# Patient Record
Sex: FEMALE | Race: BLACK OR AFRICAN AMERICAN | Employment: OTHER | URBAN - NONMETROPOLITAN AREA
[De-identification: names, ages, dates, MRNs, and addresses within clinical notes are randomized per-mention and may not be internally consistent; named-entity substitution may affect disease eponyms.]

---

## 2022-10-04 RX ORDER — OXYBUTYNIN CHLORIDE 5 MG/1
5 TABLET ORAL NIGHTLY
COMMUNITY

## 2022-10-04 RX ORDER — LANSOPRAZOLE 30 MG/1
30 CAPSULE, DELAYED RELEASE ORAL DAILY
COMMUNITY

## 2022-10-04 RX ORDER — FAMOTIDINE 20 MG/1
20 TABLET, FILM COATED ORAL 2 TIMES DAILY
COMMUNITY

## 2022-10-04 RX ORDER — ROSUVASTATIN CALCIUM 10 MG/1
10 TABLET, COATED ORAL NIGHTLY
COMMUNITY

## 2022-10-04 RX ORDER — OLMESARTAN MEDOXOMIL AND HYDROCHLOROTHIAZIDE 40/25 40; 25 MG/1; MG/1
1 TABLET ORAL DAILY
COMMUNITY

## 2022-10-04 RX ORDER — ESTRADIOL 0.5 MG/1
0.5 TABLET ORAL DAILY
COMMUNITY

## 2022-10-04 RX ORDER — FOLIC ACID 1 MG/1
1 TABLET ORAL DAILY
COMMUNITY

## 2022-10-04 RX ORDER — FLASH GLUCOSE SENSOR
KIT MISCELLANEOUS 3 TIMES DAILY
COMMUNITY

## 2022-10-04 RX ORDER — CITALOPRAM 20 MG/1
20 TABLET ORAL DAILY
COMMUNITY

## 2022-10-06 ENCOUNTER — OFFICE VISIT (OUTPATIENT)
Dept: GASTROENTEROLOGY | Age: 67
End: 2022-10-06
Payer: MEDICARE

## 2022-10-06 VITALS
BODY MASS INDEX: 24.22 KG/M2 | HEART RATE: 66 BPM | SYSTOLIC BLOOD PRESSURE: 110 MMHG | HEIGHT: 62 IN | WEIGHT: 131.6 LBS | OXYGEN SATURATION: 98 % | DIASTOLIC BLOOD PRESSURE: 68 MMHG

## 2022-10-06 DIAGNOSIS — R63.4 WEIGHT LOSS: Primary | ICD-10-CM

## 2022-10-06 PROCEDURE — 99204 OFFICE O/P NEW MOD 45 MIN: CPT | Performed by: NURSE PRACTITIONER

## 2022-10-06 PROCEDURE — 1123F ACP DISCUSS/DSCN MKR DOCD: CPT | Performed by: NURSE PRACTITIONER

## 2022-10-06 RX ORDER — CHOLECALCIFEROL (VITAMIN D3) 125 MCG
CAPSULE ORAL DAILY
COMMUNITY

## 2022-10-06 ASSESSMENT — ENCOUNTER SYMPTOMS
TROUBLE SWALLOWING: 0
BLOOD IN STOOL: 0
CONSTIPATION: 0
NAUSEA: 0
ANAL BLEEDING: 0
ABDOMINAL PAIN: 0
DIARRHEA: 0
COUGH: 0
VOMITING: 0
CHOKING: 0
SHORTNESS OF BREATH: 0
RECTAL PAIN: 0
ABDOMINAL DISTENTION: 0

## 2022-10-06 NOTE — PROGRESS NOTES
Subjective:     Patient ID: Jessica Cervantes is a 75 y.o. female  PCP: Ambar Mckeon  Referring Provider: Nelly Barr, CNP    HPI  Patient presents to the office today with the following complaints: New Patient (Patient referred by MIMA Gtz for weight loss. )      Pt seen today for weight loss. Pt reports weight loss of 15-20 lbs in the last few months. Denies any abdominal pain, nausea, vomiting, diarrhea, constipation, changes in bowel habits, and blood in stool. She reports normal appetite. Hx chronic GERD, controlled with Prevacid daily and Pepcid. Last Colonoscopy - diverticulosis per pt (Dr Jocelyne Freitas)  Family hx colon cancer - Mother    Assessment:     1. Weight loss            Plan:   - Schedule Colonoscopy and EGD  Instruct on bowel prep. Nothing to eat or drink after midnight the day of the exam.  Unable to drive for 24 hours after the procedure. No aspirin or nonsteroidal anti-inflammatories for 5 days before procedure. I have discussed the benefits, alternatives, and risks (including bleeding, perforation and death)  for pursuing Endoscopy (EGD/Colonscopy/EUS/ERCP) with the patient and they are willing to continue. We also discussed the need for anesthesia, IV access, proper dietary changes, medication changes if necessary, and need for bowel prep (if ordered) prior to their Endoscopic procedure. They are aware they must have someone accompany them to their scheduled procedure to drive them home - they agree to the above and are willing to continue. Orders  No orders of the defined types were placed in this encounter. Medications  No orders of the defined types were placed in this encounter. Patient History:     History reviewed. No pertinent past medical history.     Past Surgical History:   Procedure Laterality Date    ANKLE SURGERY Left     ARM SURGERY Left     BACK SURGERY      2     SECTION      3    CYST REMOVAL Right     Under Right Breast HEMORRHOID SURGERY      HYSTERECTOMY, TOTAL ABDOMINAL (CERVIX REMOVED)      KNEE ARTHROSCOPY         Family History   Problem Relation Age of Onset    Colon Cancer Mother     Heart Disease Mother     Stroke Mother     Diabetes Mother     Hypertension Mother     Heart Attack Father        Social History     Socioeconomic History    Marital status:      Spouse name: None    Number of children: None    Years of education: None    Highest education level: None   Tobacco Use    Smoking status: Never    Smokeless tobacco: Never   Substance and Sexual Activity    Alcohol use: Not Currently       Current Outpatient Medications   Medication Sig Dispense Refill    Cholecalciferol (VITAMIN D) 125 MCG (5000 UT) CAPS Take by mouth daily      NONFORMULARY Take 500 mg by mouth daily Fermented Beet      lansoprazole (PREVACID) 30 MG delayed release capsule Take 30 mg by mouth daily      rosuvastatin (CRESTOR) 10 MG tablet Take 10 mg by mouth at bedtime      olmesartan-hydroCHLOROthiazide (BENICAR HCT) 40-25 MG per tablet Take 1 tablet by mouth daily      famotidine (PEPCID) 20 MG tablet Take 20 mg by mouth 2 times daily      citalopram (CELEXA) 20 MG tablet Take 20 mg by mouth daily      metFORMIN (GLUCOPHAGE) 500 MG tablet Take 500 mg by mouth 2 times daily (with meals)      estradiol (ESTRACE) 0.5 MG tablet Take 0.5 mg by mouth daily      oxybutynin (DITROPAN) 5 MG tablet Take 5 mg by mouth at bedtime      Methotrexate 2.5 MG/ML SOLN Take by mouth once a week      folic acid (FOLVITE) 1 MG tablet Take 1 mg by mouth daily      Continuous Blood Gluc Sensor ([address] [place] [address]) MISC by Does not apply route in the morning, at noon, and at bedtime       No current facility-administered medications for this visit. Allergies   Allergen Reactions    Penicillins Itching       Review of Systems   Constitutional:  Positive for unexpected weight change. Negative for activity change, appetite change, fatigue and fever. HENT:  Negative for trouble swallowing. Respiratory:  Negative for cough, choking and shortness of breath. Cardiovascular:  Negative for chest pain. Gastrointestinal:  Negative for abdominal distention, abdominal pain, anal bleeding, blood in stool, constipation, diarrhea, nausea, rectal pain and vomiting. Allergic/Immunologic: Negative for food allergies. All other systems reviewed and are negative. Objective:     /68   Pulse 66   Ht 5' 2\" (1.575 m)   Wt 131 lb 9.6 oz (59.7 kg)   SpO2 98%   BMI 24.07 kg/m²     Physical Exam  Vitals reviewed. Constitutional:       General: She is not in acute distress. Appearance: She is well-developed. HENT:      Head: Normocephalic and atraumatic. Right Ear: External ear normal.      Left Ear: External ear normal.      Nose: Nose normal.   Eyes:      Conjunctiva/sclera: Conjunctivae normal.      Pupils: Pupils are equal, round, and reactive to light. Cardiovascular:      Rate and Rhythm: Normal rate and regular rhythm. Heart sounds: Normal heart sounds. No murmur heard. No friction rub. No gallop. Pulmonary:      Effort: Pulmonary effort is normal. No respiratory distress. Breath sounds: Normal breath sounds. Abdominal:      General: Bowel sounds are normal. There is no distension. Palpations: Abdomen is soft. There is no mass. Tenderness: There is no abdominal tenderness. There is no guarding or rebound. Musculoskeletal:         General: Normal range of motion. Cervical back: Normal range of motion and neck supple. Skin:     General: Skin is warm and dry. Findings: No rash. Nails: There is no clubbing. Neurological:      Mental Status: She is alert and oriented to person, place, and time. Gait: Gait normal.   Psychiatric:         Behavior: Behavior normal.         Thought Content:  Thought content normal.

## 2022-10-06 NOTE — PATIENT INSTRUCTIONS
Schedule colonoscopy and endoscopy. Do not eat or drink after midnight the day of the procedure. Allowed medications can be taken with a small sip of water. Please review your prep instructions for allowed medications. You will not be able to drive for 24 hours after the procedure due to sedation. Must have a responsible adult, 18 years or older, accompany you to drive you home the day of your procedure. If you are on blood thinners, clearance from the prescribing physician will be obtained before your procedure is scheduled. If it is determined it is not safe to hold these medications for a short time an alternative procedure for evaluation may be recommended. No aspirin, ibuprofen, naproxen, fish oil or vitamin E for 5 days before procedure. Risks of colonoscopy and endoscopy include, but are not limited to, perforation, bleeding, and infection, Risk of perforation and bleeding are increased if there is a polyp removed or dilation completed. Anesthesia risks will be reviewed with you before the procedure by a member of the anesthesia department. Your physician may also schedule a follow up appointment with the nurse practitioner to discuss pathology, symptoms or to check if you have had any problems related to your procedure. If you prefer not to return to the office after your procedure please discuss this with your physician on the day of your colonoscopy. The physician will talk with you and/or your family after the procedure is completed. Final recommendations are based on the pathologist report if biopsies or specimens are taken. If polyps are removed during the procedure they will be sent to a pathologist for analysis. Unless you have a follow up appointment scheduled, you will be notified by mail of the pathology results within 4 weeks. If you have not received results after 4 weeks you may call the office to obtain this information. For Colonoscopy:   You will be given specific directions regarding restrictions to diet and bowel prep instructions including laxatives. Please read these instructions one week prior to your scheduled procedure to ensure that you are prepared. If you have any questions regarding these instructions please call our office Mon through Fri from 8:00 am to 4:00 pm.     Follow prep instructions provided for bowel prep. Take all of the bowel prep as directed. If you are having problems with nausea, stop your prep for 30-45 min to allow the nausea to subside before resuming your prep. It is important to drink plenty of fluids throughout the day before taking your laxatives. This will help to protect your kidneys, prevent dehydration and maximize the effect of the bowel prep. Your diet before a colonoscopy bowel preparation is very important to ensure a successful colon exam. It is recommended to consider certain changes to your diet three to four days prior to the procedure. Remember that your bowels need to be completely empty for the exam.    What foods are good to eat? Cut down on heavy solid foods three to four days before the procedure and start introducing lighter meals to your diet. The following food suggestions are a good part of your diet before a colonoscopy bowel preparation. Light meat that is easily digestible such as chicken (without the skin)   Potatoes without skin   Cheese   Eggs   A light meal of steamed white fish   Light clear soups    Foods and drinks to avoid  Avoid foods that contain too much fiber. Stay clear of dark colored beverages. They can stick to the walls of the digestive tract and make it difficult to differentiate from blood.  Some of these foods are:  Red meat, rice, nuts and vegetables   Milk, other milk based fluids and cream   Most fruit and puddings   Whole grain pasta   Cereals, bran and seeds   Colored beverages, especially those that are red or purple in color   Red colored Jell-O   On the day before the colonoscopy, continue to drink plenty of clear fluids. It is important   to keep yourself hydrated before the exam.     Please follow all instructions as provided for cleansing the bowel. Failure to have an adequately prepped colon may cause you to have incomplete exam with further testing required.      [email or web address]

## 2022-10-11 ENCOUNTER — APPOINTMENT (OUTPATIENT)
Dept: OPERATING ROOM | Age: 67
End: 2022-10-11

## 2022-10-11 ENCOUNTER — ANESTHESIA EVENT (OUTPATIENT)
Dept: OPERATING ROOM | Age: 67
End: 2022-10-11

## 2022-10-11 ENCOUNTER — HOSPITAL ENCOUNTER (OUTPATIENT)
Age: 67
Setting detail: SPECIMEN
Discharge: HOME | End: 2022-10-11
Payer: MEDICARE

## 2022-10-11 ENCOUNTER — HOSPITAL ENCOUNTER (OUTPATIENT)
Age: 67
Setting detail: OUTPATIENT SURGERY
Discharge: HOME | End: 2022-10-11
Attending: INTERNAL MEDICINE | Admitting: INTERNAL MEDICINE
Payer: MEDICARE

## 2022-10-11 ENCOUNTER — ANESTHESIA (OUTPATIENT)
Dept: OPERATING ROOM | Age: 67
End: 2022-10-11

## 2022-10-11 ENCOUNTER — TELEPHONE (OUTPATIENT)
Dept: GASTROENTEROLOGY | Age: 67
End: 2022-10-11

## 2022-10-11 VITALS
DIASTOLIC BLOOD PRESSURE: 61 MMHG | HEART RATE: 69 BPM | OXYGEN SATURATION: 95 % | WEIGHT: 131 LBS | BODY MASS INDEX: 24.11 KG/M2 | SYSTOLIC BLOOD PRESSURE: 111 MMHG | HEIGHT: 62 IN | RESPIRATION RATE: 16 BRPM | TEMPERATURE: 97.3 F

## 2022-10-11 DIAGNOSIS — R63.4 WEIGHT LOSS: Primary | ICD-10-CM

## 2022-10-11 PROCEDURE — 43239 EGD BIOPSY SINGLE/MULTIPLE: CPT

## 2022-10-11 PROCEDURE — 88342 IMHCHEM/IMCYTCHM 1ST ANTB: CPT

## 2022-10-11 PROCEDURE — 43239 EGD BIOPSY SINGLE/MULTIPLE: CPT | Performed by: INTERNAL MEDICINE

## 2022-10-11 PROCEDURE — 45378 DIAGNOSTIC COLONOSCOPY: CPT

## 2022-10-11 PROCEDURE — 45378 DIAGNOSTIC COLONOSCOPY: CPT | Performed by: INTERNAL MEDICINE

## 2022-10-11 PROCEDURE — 88305 TISSUE EXAM BY PATHOLOGIST: CPT

## 2022-10-11 RX ORDER — DIPHENHYDRAMINE HYDROCHLORIDE 50 MG/ML
12.5 INJECTION INTRAMUSCULAR; INTRAVENOUS
Status: CANCELLED | OUTPATIENT
Start: 2022-10-11 | End: 2022-10-12

## 2022-10-11 RX ORDER — ONDANSETRON 2 MG/ML
4 INJECTION INTRAMUSCULAR; INTRAVENOUS
Status: CANCELLED | OUTPATIENT
Start: 2022-10-11 | End: 2022-10-12

## 2022-10-11 RX ORDER — LIDOCAINE HYDROCHLORIDE 10 MG/ML
INJECTION, SOLUTION EPIDURAL; INFILTRATION; INTRACAUDAL; PERINEURAL PRN
Status: DISCONTINUED | OUTPATIENT
Start: 2022-10-11 | End: 2022-10-11 | Stop reason: SDUPTHER

## 2022-10-11 RX ORDER — SODIUM CHLORIDE 0.9 % (FLUSH) 0.9 %
5-40 SYRINGE (ML) INJECTION EVERY 12 HOURS SCHEDULED
Status: CANCELLED | OUTPATIENT
Start: 2022-10-11

## 2022-10-11 RX ORDER — SODIUM CHLORIDE 0.9 % (FLUSH) 0.9 %
5-40 SYRINGE (ML) INJECTION PRN
Status: CANCELLED | OUTPATIENT
Start: 2022-10-11

## 2022-10-11 RX ORDER — PROPOFOL 10 MG/ML
INJECTION, EMULSION INTRAVENOUS PRN
Status: DISCONTINUED | OUTPATIENT
Start: 2022-10-11 | End: 2022-10-11 | Stop reason: SDUPTHER

## 2022-10-11 RX ORDER — SODIUM CHLORIDE 9 MG/ML
INJECTION, SOLUTION INTRAVENOUS CONTINUOUS
Status: DISCONTINUED | OUTPATIENT
Start: 2022-10-11 | End: 2022-10-11 | Stop reason: HOSPADM

## 2022-10-11 RX ORDER — SODIUM CHLORIDE 9 MG/ML
INJECTION, SOLUTION INTRAVENOUS PRN
Status: CANCELLED | OUTPATIENT
Start: 2022-10-11

## 2022-10-11 RX ADMIN — PROPOFOL 480 MG: 10 INJECTION, EMULSION INTRAVENOUS at 10:40

## 2022-10-11 RX ADMIN — LIDOCAINE HYDROCHLORIDE 30 MG: 10 INJECTION, SOLUTION EPIDURAL; INFILTRATION; INTRACAUDAL; PERINEURAL at 10:40

## 2022-10-11 RX ADMIN — SODIUM CHLORIDE: 9 INJECTION, SOLUTION INTRAVENOUS at 09:41

## 2022-10-11 NOTE — H&P
Patient Name: Jessica Cervantes  : 1955  MRN: [id number]  DATE: 10/11/22    Allergies: Allergies   Allergen Reactions    Penicillins Itching        ENDOSCOPY  History and Physical    Procedure:    [x] Diagnostic Colonoscopy       [] Screening Colonoscopy  [x] EGD      [] ERCP      [] EUS       [] Other    [x] Previous office notes/History and Physical reviewed from the patients chart. Please see EMR for further details of HPI. I have examined the patient's status immediately prior to the procedure and:      Indications/HPI: For both EGD and colonoscopy exams today:  1 Weight loss -unintentional   2. Family hx colon cancer - Mother  Last Colonoscopy - diverticulosis per pt (Dr Jocelyne Freitas)    []Abdominal Pain   []Cancer- GI/Lung     []Fhx of colon CA/polyps  []History of Polyps  []Barretts            []Melena  []Abnormal Imaging              []Dysphagia              []Persistent Pneumonia   []Anemia                            []Food Impaction        []History of Polyps  [] GI Bleed             []Pulmonary nodule/Mass   []Change in bowel habits []Heartburn/Reflux  []Rectal Bleed (BRBPR)  []Chest Pain - Non Cardiac []Heme (+) Stool []Ulcers  []Constipation  []Hemoptysis  []Varices  []Diarrhea  []Hypoxemia    []Nausea/Vomiting   []Screening   []Crohns/Colitis  []Other:     Anesthesia:   [x] MAC [] Moderate Sedation   [] General   [] None     ROS: 12 pt Review of Symptoms was negative unless mentioned above    Medications:   Prior to Admission medications    Medication Sig Start Date End Date Taking?  Authorizing Provider   Cholecalciferol (VITAMIN D) 125 MCG (5000 UT) CAPS Take by mouth daily    Historical Provider, MD   NONFORMULARY Take 500 mg by mouth daily Fermented Beet    Historical Provider, MD   lansoprazole (PREVACID) 30 MG delayed release capsule Take 30 mg by mouth daily    Historical Provider, MD   rosuvastatin (CRESTOR) 10 MG tablet Take 10 mg by mouth at bedtime    Historical Provider, MD olmesartan-hydroCHLOROthiazide (BENICAR HCT) 40-25 MG per tablet Take 1 tablet by mouth daily    Historical Provider, MD   famotidine (PEPCID) 20 MG tablet Take 20 mg by mouth 2 times daily    Historical Provider, MD   citalopram (CELEXA) 20 MG tablet Take 20 mg by mouth daily    Historical Provider, MD   metFORMIN (GLUCOPHAGE) 500 MG tablet Take 500 mg by mouth 2 times daily (with meals)    Historical Provider, MD   estradiol (ESTRACE) 0.5 MG tablet Take 0.5 mg by mouth daily    Historical Provider, MD   Continuous Blood Gluc Sensor ([address] [place] [address]) MISC by Does not apply route in the morning, at noon, and at bedtime  Patient not taking: Reported on 10/11/2022    Historical Provider, MD   oxybutynin (DITROPAN) 5 MG tablet Take 5 mg by mouth at bedtime    Historical Provider, MD   Methotrexate 2.5 MG/ML SOLN Take by mouth once a week    Historical Provider, MD   folic acid (FOLVITE) 1 MG tablet Take 1 mg by mouth daily    Historical Provider, MD       Past Medical History:  Past Medical History:   Diagnosis Date    Arthritis     Diabetes mellitus ([facility] [address]     GERD (gastroesophageal reflux disease)     Hyperlipidemia     Hypertension        Past Surgical History:  Past Surgical History:   Procedure Laterality Date    ANKLE SURGERY Left     ARM SURGERY Left     BACK SURGERY      2     SECTION      3    CYST REMOVAL Right     Under Right Breast    HEMORRHOID SURGERY      HYSTERECTOMY, TOTAL ABDOMINAL (CERVIX REMOVED)      KNEE ARTHROSCOPY         Social History:  Social History     Tobacco Use    Smoking status: Never    Smokeless tobacco: Never   Vaping Use    Vaping Use: Never used   Substance Use Topics    Alcohol use: Not Currently    Drug use: Never       Vital Signs:   Vitals:    10/11/22 0908   BP: (!) 146/77   Pulse: 56   Resp: 16   Temp: 97.3 °F (36.3 °C)   SpO2: 100%        Physical Exam:  Cardiac:  [x]WNL  []Comments:  Pulmonary:  [x]WNL   []Comments:  Neuro/Mental Status:  [x]WNL []Comments:  Abdominal:  [x]WNL    []Comments:  Other:   []WNL  []Comments:    Informed Consent:  The risks and benefits of the procedure have been discussed with either the patient or if they cannot consent, their representative. Assessment:  Patient examined and appropriate for planned sedation and procedure. Plan:  Proceed with planned sedation and procedure as above.          Marcia Roberto MD

## 2022-10-11 NOTE — OP NOTE
Endoscopic Procedure Note    Patient: Leslye Douglas : 1955  Med Rec#: [id number] Acc#: [id number]     Primary Care Provider Timothy Land    Endoscopist: Maliha Gottlieb MD, MD    Date of Procedure:  10/11/2022    Procedure:   1. EGD with cold biopsies    Indications: For both EGD and colonoscopy exams today:  1 Weight loss -unintentional   2. Family hx colon cancer - Mother  Last Colonoscopy - diverticulosis per pt (Dr Isak Richardson)    Anesthesia:  Sedation was administered by anesthesia who monitored the patient during the procedure. Estimated Blood Loss: minimal    Procedure:   After reviewing the patient's chart and obtaining informed consent, the patient was placed in the left lateral decubitus position. A forward-viewing Olympus endoscope was lubricated and inserted through the mouth into the oropharynx. Under direct visualization, the upper esophagus was intubated. The scope was advanced to the level of the third portion of duodenum. Scope was slowly withdrawn with careful inspection of the mucosal surfaces. The scope was retroflexed for inspection of the gastric fundus and incisura. Findings and maneuvers are listed in impression below. The patient tolerated the procedure well. The scope was removed. There were no immediate complications. Findings/IMPRESSION:  Esophagus: normal and EG junction at 35 cm also appeared normal.    There is a small 2 to 3 cm in length hiatal hernia present. Stomach:  abnormal: Patchy mucosal changes with areas of erythema and few small 1 to 2 mm erosions in the antrum near the pylorus suggestive of mild gastritis noted -  Gastric biopsies were taken from the antrum and body to rule out Helicobacter pylori infection. NO erosions or ulcers or nodules or strictures or webs or rings or mass lesions or extrinsic compression or diverticula noted.  An empirical [address] fr bougie dilation was performed and random cold biopsies were taken to check for EoE and NERD. Duodenum: Normal. Random biopsies were taken to check for Celiac disease and other causes of villous atrophy. No obvious lesions to explain the patient's unintentional weight loss were discovered on this exam.    RECOMMENDATIONS:    1. Await path results, the patient will be contacted in 7-10 days with biopsy results. The results were discussed with the patient and family. A copy of the images obtained were given to the patient.      (Please note that portions of this note were completed with a voice recognition program. Efforts were made to edit the dictations but occasionally words may be mis-transcribed.)     Keon Marie MD, MD  10/11/2022  10:43 AM

## 2022-10-11 NOTE — ANESTHESIA PRE PROCEDURE
 0.9 % sodium chloride infusion   IntraVENous Continuous Hitesh Ruiz MD           Allergies: Allergies   Allergen Reactions    Penicillins Itching       Problem List:  There is no problem list on file for this patient. Past Medical History:        Diagnosis Date    Arthritis     Diabetes mellitus ([address]     GERD (gastroesophageal reflux disease)     Hyperlipidemia     Hypertension        Past Surgical History:        Procedure Laterality Date    ANKLE SURGERY Left     ARM SURGERY Left     BACK SURGERY      2     SECTION      3    CYST REMOVAL Right     Under Right Breast    HEMORRHOID SURGERY      HYSTERECTOMY, TOTAL ABDOMINAL (CERVIX REMOVED)      KNEE ARTHROSCOPY         Social History:    Social History     Tobacco Use    Smoking status: Never    Smokeless tobacco: Never   Substance Use Topics    Alcohol use: Not Currently                                Counseling given: Not Answered      Vital Signs (Current):   Vitals:    10/11/22 0908   BP: (!) 146/77   Pulse: 56   Resp: 16   Temp: 97.3 °F (36.3 °C)   TempSrc: Temporal   SpO2: 100%   Weight: 131 lb (59.4 kg)   Height: 5' 2\" (1.575 m)                                              BP Readings from Last 3 Encounters:   10/11/22 (!) 146/77   10/06/22 110/68       NPO Status: Time of last liquid consumption: 530                        Time of last solid consumption: 1700                        Date of last liquid consumption: 10/11/22                        Date of last solid food consumption: 10/09/22    BMI:   Wt Readings from Last 3 Encounters:   10/11/22 131 lb (59.4 kg)   10/06/22 131 lb 9.6 oz (59.7 kg)     Body mass index is 23.96 kg/m².     CBC: No results found for: WBC, RBC, HGB, HCT, MCV, RDW, PLT    CMP: No results found for: NA, K, CL, CO2, BUN, CREATININE, GFRAA, AGRATIO, LABGLOM, GLUCOSE, GLU, PROT, CALCIUM, BILITOT, ALKPHOS, AST, ALT    POC Tests: No results for input(s): POCGLU, POCNA, POCK, POCCL,

## 2022-10-11 NOTE — TELEPHONE ENCOUNTER
10-11-22 Per Dr Rosa Reeves,       Schedule a CT scan of the abdomen and pelvis with and without IV and p.o. contrast if not done recently, for further evaluation of her unexplained weight loss.       Orders put in Epic       Transferred to Scheduling

## 2022-10-11 NOTE — DISCHARGE INSTRUCTIONS
1. Await path results, the patient will be contacted in 7-10 days with biopsy results. 2. Repeat colonoscopy: pending pathology -in 3 years due to family history; sooner if her personal or family history as pertaining to colorectal cancer risk changes requiring an earlier exam or if the patient were to develop lower GI symptoms such as bleeding, abdominal pain, change in bowel habits or stool caliber or if the patient has anemia or unexplained weight loss in the future. 3.  Schedule a CT scan of the abdomen and pelvis with and without IV and p.o. contrast if not done recently, for further evaluation of her unexplained weight loss.     - Resume previous meds and diet  - GI clinic f/u 4-6 weeks with Ms. Perez Springer scheduled f/u appts with other MDs     - NO ASA/NSAIDs x 2 weeks

## 2022-10-11 NOTE — OP NOTE
Patient: Wendy Thomason [facility] : 1955  [facility] Rec#: [id number] Acc#: [id number]   Primary Care Provider Gustavopolo Louann    Date of Procedure:  10/11/2022    Endoscopist: Hietsh Ruiz MD, MD    Referring Provider: Jarred Lew,     Operation Performed: Colonoscopy up to terminal ileum    Indications: For both EGD and colonoscopy exams today:  1 Weight loss -unintentional   2. Family hx colon cancer - Mother  Last Colonoscopy - diverticulosis per pt (Dr Arianne Beckman)    Anesthesia:  Sedation was administered by anesthesia who monitored the patient during the procedure. I met with Excell Certain prior to procedure. We discussed the procedure itself, and I have discussed the risks of endoscopy (including-- but not limited to-- pain, discomfort, bleeding potentially requiring second endoscopic procedure and/or blood transfusion, organ perforation requiring operative repair, damage to organs near the colon, infection, aspiration, cardiopulmonary/allergic reaction), benefits, indications to endoscopy. Additionally, we discussed options other than colonoscopy. The patient expressed understanding. All questions answered. The patient decided to proceed with the procedure. Signed informed consent was placed on the chart. Blood Loss: minimal    Withdrawal time: More than 6 minutes  Bowel Prep: adequate     Complications: no immediate complications    DESCRIPTION OF PROCEDURE:     A time out was performed. After written informed consent was obtained, the patient was placed in the left lateral position. The perianal area was inspected, and a digital rectal exam was performed. A rectal exam was performed: normal tone, no palpable lesions. At this point, a forward viewing Olympus colonoscope was inserted into the anus and carefully advanced to the terminal ileum. The cecum was identified by the ileocecal valve and the appendiceal orifice.  The colonoscope was then slowly withdrawn with careful inspection of the mucosa in a linear and circumferential fashion. The scope was retroflexed in the rectum. Suction was utilized during the procedure to remove as much air as possible from the bowel. The colonoscope was removed from the patient, and the procedure was terminated. Findings are listed below. Findings: The mucosa appeared normal throughout the entire examined colon and examined terminal ileum. NO large polyps or masses or strictures or colitis or any evidence of IBD or other obvious lesions. Internal hemorrhoids-Grade 1  Retroflexion in the rectum was otherwise normal and revealed no further abnormalities      No clear-cut lesions to explain the patient's unintentional weight loss were discovered. Non-GI causes are in the differential diagnosis. Recommendations:  1. Repeat colonoscopy: pending pathology -in 3 years due to family history; sooner if her personal or family history as pertaining to colorectal cancer risk changes requiring an earlier exam or if the patient were to develop lower GI symptoms such as bleeding, abdominal pain, change in bowel habits or stool caliber or if the patient has anemia or unexplained weight loss in the future. 2.  Schedule a CT scan of the abdomen and pelvis with and without IV and p.o. contrast if not done recently, for further evaluation of her unexplained weight loss. - Resume previous meds and diet  - GI clinic f/u 4-6 weeks with Ms. Nima Alvarez scheduled f/u appts with other MDs     - NO ASA/NSAIDs x 2 weeks     Findings and recommendations were discussed w/ the patient. A copy of the images was provided.     (Please note that portions of this note were completed with a voice recognition program. Efforts were made to edit the dictations but occasionally words may be mis-transcribed.)     Maliha Gottlieb MD, MD  10/11/2022  10:43 AM

## 2022-10-11 NOTE — ANESTHESIA POSTPROCEDURE EVALUATION
Department of Anesthesiology  Postprocedure Note    Patient:  Thompson Bhat  MRN: [id number]  YOB: 1955  Date of evaluation: 10/11/2022      Procedure Summary     Date: 10/11/22 Room / Location: [facility] 02 / [address]    Anesthesia Start: 0360 Anesthesia Stop: 5482    Procedures:       EGD BIOPSY (Esophagus)      COLONOSCOPY DIAGNOSTIC (Abdomen) Diagnosis:       Chronic GERD      Weight loss      (Chronic GERD [K21.9])      (Weight loss [R63.4])    Surgeons: Saúl Raza MD Responsible Provider: MIMA Heredia CRNA    Anesthesia Type: General ASA Status: 2          Anesthesia Type: General    Jordan Phase I:      Jordan Phase II:        Anesthesia Post Evaluation    Patient location during evaluation: bedside  Patient participation: complete - patient participated  Level of consciousness: awake  Pain score: 0  Airway patency: patent  Nausea & Vomiting: no nausea and no vomiting  Complications: no  Cardiovascular status: hemodynamically stable  Respiratory status: acceptable, room air and spontaneous ventilation  Hydration status: euvolemic

## 2022-10-20 ENCOUNTER — HOSPITAL ENCOUNTER (OUTPATIENT)
Dept: CT IMAGING | Age: 67
Discharge: HOME | End: 2022-10-20
Payer: MEDICARE

## 2022-10-20 DIAGNOSIS — R63.4 WEIGHT LOSS: ICD-10-CM

## 2022-10-20 PROCEDURE — 6360000004 HC RX CONTRAST MEDICATION: Performed by: INTERNAL MEDICINE

## 2022-10-20 PROCEDURE — 74178 CT ABD&PLV WO CNTR FLWD CNTR: CPT | Performed by: RADIOLOGY

## 2022-10-20 PROCEDURE — 74178 CT ABD&PLV WO CNTR FLWD CNTR: CPT

## 2022-10-20 RX ADMIN — IOPAMIDOL 75 ML: 755 INJECTION, SOLUTION INTRAVENOUS at 11:33

## 2022-10-24 NOTE — TELEPHONE ENCOUNTER
10-24-22 Faxed Office Note, Path, CT, Op note to [phone]    Case #[id number]    [facility] [phone] or [phone]   [phone]     Johanna Rivera per voice mail they are experiencing difficulties and to call back a later time.

## 2022-10-24 NOTE — TELEPHONE ENCOUNTER
Theo Espinoza with the PA Dept at [facility] called from [phone] called about this patient, stating she is to be scheduled for a CT Scan and her insurance is requiring a peer to peer at [phone] to gain approval, please use Case # [id number] when calling and this is good through 10-25-22. Insurance is requiring additional information on other testing done for the weight loss such as labs etc. I will forward to Kylah Christy and RENAE saunders. It was noted additional information can be faxed to [phone] but the Case # must be included if you choose to fax vs calling.  Sulaiman saunders

## 2022-10-25 NOTE — TELEPHONE ENCOUNTER
10-25-22 Called Charlie     She stated that the Peer to Peer ended on yesterday 10-24-22.   The next step would be to contact the [employer] for an appeals option     Called and notified Kofi Espinoza

## 2022-12-07 ENCOUNTER — OFFICE VISIT (OUTPATIENT)
Dept: GASTROENTEROLOGY | Age: 67
End: 2022-12-07
Payer: MEDICARE

## 2022-12-07 VITALS
WEIGHT: 135 LBS | HEIGHT: 62 IN | HEART RATE: 87 BPM | OXYGEN SATURATION: 98 % | SYSTOLIC BLOOD PRESSURE: 115 MMHG | DIASTOLIC BLOOD PRESSURE: 80 MMHG | BODY MASS INDEX: 24.84 KG/M2

## 2022-12-07 DIAGNOSIS — K29.50 MILD CHRONIC GASTRITIS: Primary | ICD-10-CM

## 2022-12-07 DIAGNOSIS — K31.7 GASTRIC POLYP: ICD-10-CM

## 2022-12-07 PROCEDURE — 99213 OFFICE O/P EST LOW 20 MIN: CPT | Performed by: NURSE PRACTITIONER

## 2022-12-07 PROCEDURE — 1123F ACP DISCUSS/DSCN MKR DOCD: CPT | Performed by: NURSE PRACTITIONER

## 2022-12-07 ASSESSMENT — ENCOUNTER SYMPTOMS
ANAL BLEEDING: 0
COUGH: 0
RECTAL PAIN: 0
ABDOMINAL PAIN: 0
TROUBLE SWALLOWING: 0
SHORTNESS OF BREATH: 0
BLOOD IN STOOL: 0
NAUSEA: 0
CONSTIPATION: 0
DIARRHEA: 0
ABDOMINAL DISTENTION: 0
VOMITING: 0
CHOKING: 0

## 2022-12-07 NOTE — PROGRESS NOTES
Subjective:     Patient ID: Liam Brady is a 75 y.o. female  PCP: Leslie Perez  Referring Provider: No ref. provider found    HPI  Patient presents to the office today with the following complaints: Follow-up      Pt seen today for follow up after EGD and Colonoscopy on 10/11/22 for c/o weight loss and chronic GERD. EGD and Colonoscopy were unremarkable in evaluation of weight loss. CT was ordered and unremarkable. Pt has gained 4 lbs since last visit. She denies any current issues or concerns. All scope and pathology reports were reviewed and discussed with patient. All questions answered, pt verbalized understanding. EGD Findings/IMPRESSION 10/11/22:  Esophagus: normal and EG junction at 35 cm also appeared normal.  There is a small 2 to 3 cm in length hiatal hernia present. Stomach:  abnormal: Patchy mucosal changes with areas of erythema and few small 1 to 2 mm erosions in the antrum near the pylorus suggestive of mild gastritis noted -  Gastric biopsies were taken from the antrum and body to rule out Helicobacter pylori infection. NO erosions or ulcers or nodules or strictures or webs or rings or mass lesions or extrinsic compression or diverticula noted. An empirical [address] fr bougie dilation was performed and random cold biopsies were taken to check for EoE and NERD. Duodenum: Normal. Random biopsies were taken to check for Celiac disease and other causes of villous atrophy. No obvious lesions to explain the patient's unintentional weight loss were discovered on this exam.  RECOMMENDATIONS:    1. Await path results, the patient will be contacted in 7-10 days with biopsy results. Colonoscopy Findings 10/11/22: The mucosa appeared normal throughout the entire examined colon and examined terminal ileum. NO large polyps or masses or strictures or colitis or any evidence of IBD or other obvious lesions.   Internal hemorrhoids-Grade 1  Retroflexion in the rectum was otherwise normal and revealed no further abnormalities    No clear-cut lesions to explain the patient's unintentional weight loss were discovered. Non-GI causes are in the differential diagnosis. Recommendations:  1. Repeat colonoscopy: pending pathology -in 3 years due to family history; sooner if her personal or family history as pertaining to colorectal cancer risk changes requiring an earlier exam or if the patient were to develop lower GI symptoms such as bleeding, abdominal pain, change in bowel habits or stool caliber or if the patient has anemia or unexplained weight loss in the future. 2.  Schedule a CT scan of the abdomen and pelvis with and without IV and p.o. contrast if not done recently, for further evaluation of her unexplained weight loss. - Resume previous meds and diet  - GI clinic f/u 4-6 weeks with Ms. Melo Burton scheduled f/u appts with other MDs   - NO ASA/NSAIDs x 2 weeks     FINAL DIAGNOSIS:   A.  Small intestine, duodenal biopsy:   Benign duodenal mucosa with patchy mild chronic nonspecific inflammation, negative for evidence of sprue. B.  Stomach, gastric biopsy:   1. Benign gastric mucosa with minimal chronic inflammation. 2.  No Helicobacter pylori organisms identified by immunohistochemical   stain. C.  Stomach, gastric polyp biopsy:   1. Benign fundic gland polyp. 2.  No Helicobacter pylori organisms identified by immunohistochemical   stain. Narrative   NO PRIOR REPORT AVAILABLE   Exam: CT OF THE ABDOMEN/PELVIS WITHOUT AND WITH IV CONTRAST    Clinical data: Weight loss. Technique:  Axial CT images were acquired through the abdomen and pelvis without and with intravenous contrast using soft tissue and bone algorithms. Oral contrast was administered. Reformatted/MPR images were performed. Radiation dose: CTDIvol = 11.92    mGy, DLP = 458.48 mGy x cm. Limitations: None. Prior studies: No prior studies submitted.     Findings: Lung bases:  Slight atelectasis/scarring. No focal consolidations. Right-sided calcified granuloma. Tiny sliding hiatal hernia. Liver:   Mild fatty liver, with anterior focal fatty infiltration. No evidence of mass. No evidence of dilated ducts. Gallbladder Fossa:  Unremarkable       Spleen:  Grossly unremarkable. Pancreas/adrenal glands:   Indeterminate 1.9 x 1 cm left adrenal nodule, could be further characterized with an elective adrenal protocol MRI, if clinically warranted. Kidneys: In anatomic position. Grossly unremarkable renal size, contour and density. No renal or ureteral calculi. No evidence of a renal mass or cyst.  Perinephric space is unremarkable. Retroperitoneum: No enlarged retroperitoneal lymphadenopathy. Atherosclerosis. Peritoneal cavity:  No evidence of free air or ascites. Gastrointestinal tract: No obstruction. Significant amount of stool within the colon, correlate for constipation. Diverticulosis, without definite diverticulitis. Appendix:  The appendix is not well visualized, although no secondary evidence of acute appendicitis appreciated. Pelvis:  Hysterectomy. Distended urinary bladder. Mild asymmetric urinary bladder thickening on the left, cannot exclude a mild cystitis, correlate with UA. Osseous structures:  No acute or destructive bony process identified. Multilevel degenerative and some postoperative changes noted in the spine. Impression   1. Indeterminate small left adrenal nodule, with follow-up recommendations, as above. 2. Mild asymmetric urinary bladder thickening on the left, cannot exclude a mild cystitis, correlate with UA. 3. Significant amount of stool within the colon, correlate for constipation. 4. No evidence of bowel obstruction, free air, or drainable collections. No intra-abdominal masses appreciated. 5. Other findings, as above. Please see comments above. Recommendation: As above.        All CT scans at this facility utilize dose modulation, iterative reconstruction, and/or weight based dosing when appropriate to reduce radiation dose to as low as reasonably achievable. Electronically Signed by Jennifer Martínez MD at 20-Oct-2022 04:00:24 PM EST                Assessment:     1. Mild chronic gastritis    2. Gastric polyp            Plan:   - Continue Prevacid daily  - Follow up prn or sooner if needed  - Call with any questions or concerns         Orders  No orders of the defined types were placed in this encounter. Medications  No orders of the defined types were placed in this encounter.         Patient History:     Past Medical History:   Diagnosis Date    Arthritis     Diabetes mellitus ([address]     GERD (gastroesophageal reflux disease)     Hyperlipidemia     Hypertension        Past Surgical History:   Procedure Laterality Date    ANKLE SURGERY Left     ARM SURGERY Left     BACK SURGERY      2     SECTION      3    COLONOSCOPY N/A 10/11/2022    Dr Abdifatah Mckenzie, normal throughout entire colon & ileum, int hem Gr 1, no clear cut lesions to explain symptoms, 3 year recall    CYST REMOVAL Right     Under Right Breast    ESOPHAGOGASTRODUODENOSCOPY  10/11/2022    Dr Brian Raza 54 fr bougie dilation    HEMORRHOID SURGERY      HYSTERECTOMY, TOTAL ABDOMINAL (CERVIX REMOVED)      KNEE ARTHROSCOPY      UPPER GASTROINTESTINAL ENDOSCOPY N/A 10/11/2022    Dr Abdifatah Mckenzie, [address] 53 fr dil, sm 2-3 cm hh, (-)Hpylori, (-)Sprue, BDM, Benign Fundic Gland polyp       Family History   Problem Relation Age of Onset    Colon Cancer Mother     Heart Disease Mother     Stroke Mother     Diabetes Mother     Hypertension Mother     Heart Attack Father     Lung Cancer Sister     Colon Polyps Neg Hx     Liver Cancer Neg Hx        Social History     Socioeconomic History    Marital status:    Tobacco Use    Smoking status: Never    Smokeless tobacco: Never   Vaping Use    Vaping Use: Never used   Substance and Sexual Activity    Alcohol use: Not Currently    Drug use: Never       Current Outpatient Medications   Medication Sig Dispense Refill    Cholecalciferol (VITAMIN D) 125 MCG (5000 UT) CAPS Take by mouth daily      NONFORMULARY Take 500 mg by mouth daily Fermented Beet      lansoprazole (PREVACID) 30 MG delayed release capsule Take 30 mg by mouth daily      rosuvastatin (CRESTOR) 10 MG tablet Take 10 mg by mouth at bedtime      olmesartan-hydroCHLOROthiazide (BENICAR HCT) 40-25 MG per tablet Take 1 tablet by mouth daily      famotidine (PEPCID) 20 MG tablet Take 20 mg by mouth 2 times daily      citalopram (CELEXA) 20 MG tablet Take 20 mg by mouth daily      metFORMIN (GLUCOPHAGE) 500 MG tablet Take 500 mg by mouth 2 times daily (with meals)      estradiol (ESTRACE) 0.5 MG tablet Take 0.5 mg by mouth daily      Continuous Blood Gluc Sensor (PlaytoxE SENSOR SYSTEM) MISC by Does not apply route in the morning, at noon, and at bedtime      oxybutynin (DITROPAN) 5 MG tablet Take 5 mg by mouth at bedtime      Methotrexate 2.5 MG/ML SOLN Take by mouth once a week      folic acid (FOLVITE) 1 MG tablet Take 1 mg by mouth daily       No current facility-administered medications for this visit. Allergies   Allergen Reactions    Penicillins Itching       Review of Systems   Constitutional:  Negative for activity change, appetite change, fatigue, fever and unexpected weight change. HENT:  Negative for trouble swallowing. Respiratory:  Negative for cough, choking and shortness of breath. Cardiovascular:  Negative for chest pain. Gastrointestinal:  Negative for abdominal distention, abdominal pain, anal bleeding, blood in stool, constipation, diarrhea, nausea, rectal pain and vomiting. Allergic/Immunologic: Negative for food allergies. All other systems reviewed and are negative.       Objective:     /80 (Site: Left Upper Arm)   Pulse 87   Ht 5' 2\" (1.575 m)   Wt 135 lb (61.2 kg)   SpO2 98% BMI 24.69 kg/m²     Physical Exam  Vitals reviewed. Constitutional:       General: She is not in acute distress. Appearance: She is well-developed. HENT:      Head: Normocephalic and atraumatic. Right Ear: External ear normal.      Left Ear: External ear normal.      Nose: Nose normal.   Eyes:      General: No scleral icterus. Right eye: No discharge. Left eye: No discharge. Conjunctiva/sclera: Conjunctivae normal.      Pupils: Pupils are equal, round, and reactive to light. Cardiovascular:      Rate and Rhythm: Normal rate and regular rhythm. Heart sounds: Normal heart sounds. No murmur heard. Pulmonary:      Effort: Pulmonary effort is normal. No respiratory distress. Breath sounds: Normal breath sounds. No wheezing or rales. Abdominal:      General: Bowel sounds are normal. There is no distension. Palpations: Abdomen is soft. There is no mass. Tenderness: There is no abdominal tenderness. There is no guarding or rebound. Musculoskeletal:         General: Normal range of motion. Cervical back: Normal range of motion and neck supple. Skin:     General: Skin is warm and dry. Coloration: Skin is not pale. Neurological:      Mental Status: She is alert and oriented to person, place, and time.    Psychiatric:         Behavior: Behavior normal.

## 2025-02-07 ENCOUNTER — HOSPITAL ENCOUNTER (EMERGENCY)
Age: 70
Discharge: PSYCHIATRIC OR SUBSTANCE USE FACILITY | End: 2025-02-08
Payer: MEDICARE

## 2025-02-07 DIAGNOSIS — F32.A DEPRESSION, UNSPECIFIED DEPRESSION TYPE: ICD-10-CM

## 2025-02-07 DIAGNOSIS — R45.851 SUICIDAL IDEATION: Primary | ICD-10-CM

## 2025-02-07 LAB
ALBUMIN SERPL-MCNC: 4.7 G/DL (ref 3.5–5.2)
ALP SERPL-CCNC: 78 U/L (ref 35–104)
ALT SERPL-CCNC: 13 U/L (ref 5–33)
AMPHET UR QL SCN: NEGATIVE
ANION GAP SERPL CALCULATED.3IONS-SCNC: 11 MMOL/L (ref 8–16)
AST SERPL-CCNC: 20 U/L (ref 5–32)
BACTERIA URNS QL MICRO: NEGATIVE /HPF
BARBITURATES UR QL SCN: NEGATIVE
BASOPHILS # BLD: 0 K/UL (ref 0–0.2)
BASOPHILS NFR BLD: 0.5 % (ref 0–1)
BENZODIAZ UR QL SCN: NEGATIVE
BILIRUB SERPL-MCNC: 0.2 MG/DL (ref 0.2–1.2)
BILIRUB UR QL STRIP: NEGATIVE
BUN SERPL-MCNC: 16 MG/DL (ref 8–23)
BUPRENORPHINE URINE: NEGATIVE
CALCIUM SERPL-MCNC: 10.2 MG/DL (ref 8.8–10.2)
CANNABINOIDS UR QL SCN: NEGATIVE
CHLORIDE SERPL-SCNC: 104 MMOL/L (ref 98–107)
CLARITY UR: CLEAR
CO2 SERPL-SCNC: 29 MMOL/L (ref 22–29)
COCAINE UR QL SCN: NEGATIVE
COLOR UR: YELLOW
CREAT SERPL-MCNC: 0.8 MG/DL (ref 0.5–0.9)
CRYSTALS URNS MICRO: NORMAL /HPF
DRUG SCREEN COMMENT UR-IMP: NORMAL
EOSINOPHIL # BLD: 0.1 K/UL (ref 0–0.6)
EOSINOPHIL NFR BLD: 1.9 % (ref 0–5)
EPI CELLS #/AREA URNS AUTO: 0 /HPF (ref 0–5)
ERYTHROCYTE [DISTWIDTH] IN BLOOD BY AUTOMATED COUNT: 13.5 % (ref 11.5–14.5)
ETHANOLAMINE SERPL-MCNC: <10 MG/DL (ref 0–10)
FENTANYL SCREEN, URINE: NEGATIVE
FLUAV AG NPH QL: NEGATIVE
FLUBV AG NPH QL: NEGATIVE
GLUCOSE SERPL-MCNC: 91 MG/DL (ref 70–99)
GLUCOSE UR STRIP.AUTO-MCNC: NEGATIVE MG/DL
HCT VFR BLD AUTO: 38.5 % (ref 37–47)
HGB BLD-MCNC: 12.2 G/DL (ref 12–16)
HGB UR STRIP.AUTO-MCNC: NEGATIVE MG/L
HYALINE CASTS #/AREA URNS AUTO: 1 /HPF (ref 0–8)
IMM GRANULOCYTES # BLD: 0 K/UL
KETONES UR STRIP.AUTO-MCNC: NEGATIVE MG/DL
LEUKOCYTE ESTERASE UR QL STRIP.AUTO: ABNORMAL
LYMPHOCYTES # BLD: 1.3 K/UL (ref 1.1–4.5)
LYMPHOCYTES NFR BLD: 30.2 % (ref 20–40)
MAGNESIUM SERPL-MCNC: 2.5 MG/DL (ref 1.6–2.4)
MCH RBC QN AUTO: 27.6 PG (ref 27–31)
MCHC RBC AUTO-ENTMCNC: 31.7 G/DL (ref 33–37)
MCV RBC AUTO: 87.1 FL (ref 81–99)
METHADONE UR QL SCN: NEGATIVE
METHAMPHETAMINE, URINE: NEGATIVE
MONOCYTES # BLD: 0.5 K/UL (ref 0–0.9)
MONOCYTES NFR BLD: 10.8 % (ref 0–10)
NEUTROPHILS # BLD: 2.4 K/UL (ref 1.5–7.5)
NEUTS SEG NFR BLD: 56.4 % (ref 50–65)
NITRITE UR QL STRIP.AUTO: NEGATIVE
OPIATES UR QL SCN: NEGATIVE
OXYCODONE UR QL SCN: NEGATIVE
PCP UR QL SCN: NEGATIVE
PH UR STRIP.AUTO: 6 [PH] (ref 5–8)
PLATELET # BLD AUTO: 256 K/UL (ref 130–400)
PMV BLD AUTO: 10.6 FL (ref 9.4–12.3)
POTASSIUM SERPL-SCNC: 3.5 MMOL/L (ref 3.5–5)
PROT SERPL-MCNC: 7.6 G/DL (ref 6.4–8.3)
PROT UR STRIP.AUTO-MCNC: NEGATIVE MG/DL
RBC # BLD AUTO: 4.42 M/UL (ref 4.2–5.4)
RBC #/AREA URNS AUTO: 1 /HPF (ref 0–4)
SARS-COV-2 RDRP RESP QL NAA+PROBE: NOT DETECTED
SODIUM SERPL-SCNC: 144 MMOL/L (ref 136–145)
SP GR UR STRIP.AUTO: 1 (ref 1–1.03)
TRICYCLIC ANTIDEPRESSANTS, UR: NEGATIVE
UROBILINOGEN UR STRIP.AUTO-MCNC: 0.2 E.U./DL
WBC # BLD AUTO: 4.2 K/UL (ref 4.8–10.8)
WBC #/AREA URNS AUTO: 2 /HPF (ref 0–5)

## 2025-02-07 PROCEDURE — G0480 DRUG TEST DEF 1-7 CLASSES: HCPCS

## 2025-02-07 PROCEDURE — 82077 ASSAY SPEC XCP UR&BREATH IA: CPT

## 2025-02-07 PROCEDURE — 85025 COMPLETE CBC W/AUTO DIFF WBC: CPT

## 2025-02-07 PROCEDURE — 87635 SARS-COV-2 COVID-19 AMP PRB: CPT

## 2025-02-07 PROCEDURE — 87804 INFLUENZA ASSAY W/OPTIC: CPT

## 2025-02-07 PROCEDURE — 99285 EMERGENCY DEPT VISIT HI MDM: CPT

## 2025-02-07 PROCEDURE — 83735 ASSAY OF MAGNESIUM: CPT

## 2025-02-07 PROCEDURE — 81001 URINALYSIS AUTO W/SCOPE: CPT

## 2025-02-07 PROCEDURE — 80053 COMPREHEN METABOLIC PANEL: CPT

## 2025-02-07 PROCEDURE — 82607 VITAMIN B-12: CPT

## 2025-02-07 PROCEDURE — 80307 DRUG TEST PRSMV CHEM ANLYZR: CPT

## 2025-02-07 PROCEDURE — 36415 COLL VENOUS BLD VENIPUNCTURE: CPT

## 2025-02-07 RX ORDER — FOLIC ACID 1 MG/1
1 TABLET ORAL DAILY
COMMUNITY

## 2025-02-07 RX ORDER — LANSOPRAZOLE 30 MG/1
30 CAPSULE, DELAYED RELEASE ORAL DAILY
COMMUNITY

## 2025-02-07 RX ORDER — VILAZODONE HYDROCHLORIDE 20 MG/1
20 TABLET ORAL DAILY
COMMUNITY

## 2025-02-07 RX ORDER — ROSUVASTATIN CALCIUM 10 MG/1
10 TABLET, COATED ORAL DAILY
COMMUNITY

## 2025-02-07 RX ORDER — FERROUS SULFATE 325(65) MG
325 TABLET ORAL
COMMUNITY

## 2025-02-07 RX ORDER — OLMESARTAN MEDOXOMIL AND HYDROCHLOROTHIAZIDE 40/25 40; 25 MG/1; MG/1
1 TABLET ORAL DAILY
COMMUNITY

## 2025-02-07 RX ORDER — FAMOTIDINE 20 MG/1
20 TABLET, FILM COATED ORAL 2 TIMES DAILY
COMMUNITY

## 2025-02-07 RX ORDER — HYDROXYCHLOROQUINE SULFATE 200 MG/1
TABLET, FILM COATED ORAL DAILY
COMMUNITY

## 2025-02-07 NOTE — ED PROVIDER NOTES
[facility] EMERGENCY DEPARTMENT  eMERGENCY dEPARTMENT eNCOUnter      Pt Name: Akila Cruz  MRN: [id number]  Birthdate 1955  Date of evaluation: 2025  Provider: MIMA Dodge    CHIEF COMPLAINT       Chief Complaint   Patient presents with    Mental Health Problem     Sent by PCP POV for SI and paranoia         HISTORY OF PRESENT ILLNESS   (Location/Symptom, Timing/Onset,Context/Setting, Quality, Duration, Modifying Factors, Severity)  Note limiting factors.   Akila Cruz is a 69 y.o. female who presents to the emergency department with her  after being sent by her pcp.  Pt admits to stress and anxiety.  Pt takes med for depression for the last 2 months.  HAs had thoughts of SI.  No definite plan but feels like it would be better if she wasn't here.  Denies any mental health history.  says they have been  for 45 years and she thinks he is the problem.  Pt has had relatives die and there seems to be some hoarding.  Pt denies spousal abuse    The history is provided by the patient and the spouse.       NursingNotes were reviewed.    REVIEW OF SYSTEMS    (2-9 systems for level 4, 10 or more for level 5)     Review of Systems   Psychiatric/Behavioral:  Positive for suicidal ideas.        Except as noted above the remainder of the review of systems was reviewed and negative.       PAST MEDICAL HISTORY     Past Medical History:   Diagnosis Date    Anemia     Arthritis     Diabetes mellitus (HCC)     GERD (gastroesophageal reflux disease)     Hyperlipidemia     Hypertension          SURGICALHISTORY       Past Surgical History:   Procedure Laterality Date    ANKLE SURGERY Left     ARM SURGERY Left     BACK SURGERY      2     SECTION      3    COLONOSCOPY N/A 10/11/2022    Dr Kohler, normal throughout entire colon & ileum, int hem Gr 1, no clear cut lesions to explain symptoms, 3 year recall    CYST REMOVAL Right     Under Right Breast     ROSUVASTATIN (CRESTOR) 10 MG TABLET    Take 10 mg by mouth at bedtime    ROSUVASTATIN (CRESTOR) 10 MG TABLET    Take 1 tablet by mouth daily    VILAZODONE HCL (VIIBRYD) 20 MG TABS    Take 1 tablet by mouth daily            Penicillins    FAMILY HISTORY       Family History   Problem Relation Age of Onset    Colon Cancer Mother     Heart Disease Mother     Stroke Mother     Diabetes Mother     Hypertension Mother     Heart Attack Father     Lung Cancer Sister     Colon Polyps Neg Hx     Liver Cancer Neg Hx           SOCIAL HISTORY       Social History     Socioeconomic History    Marital status:      Spouse name: None    Number of children: None    Years of education: None    Highest education level: None   Tobacco Use    Smoking status: Never    Smokeless tobacco: Never   Vaping Use    Vaping status: Never Used   Substance and Sexual Activity    Alcohol use: Not Currently    Drug use: Never       SCREENINGS    [place] Coma Scale  Eye Opening: Spontaneous  Best Verbal Response: Oriented  Best Motor Response: Obeys commands  Adrian Coma Scale Score: 15        PHYSICAL EXAM    (up to 7 for level 4, 8 or more for level 5)     ED Triage Vitals [02/07/25 1538]   BP Systolic BP Percentile Diastolic BP Percentile Temp Temp Source Pulse Respirations SpO2   (!) 163/85 -- -- 97.1 °F (36.2 °C) Oral 81 16 100 %      Height Weight - Scale         -- 60.8 kg (134 lb)             Physical Exam  Vitals and nursing note reviewed.   Constitutional:       Appearance: Normal appearance. She is well-developed.   HENT:      Head: Normocephalic and atraumatic.   Eyes:      General: No scleral icterus.        Right eye: No discharge.         Left eye: No discharge.   Cardiovascular:      Rate and Rhythm: Normal rate and regular rhythm.   Pulmonary:      Effort: No respiratory distress.      Breath sounds: Normal breath sounds.   Musculoskeletal:         General: Normal range of motion.      Cervical back: Normal range of motion and

## 2025-02-08 VITALS
BODY MASS INDEX: 24.51 KG/M2 | RESPIRATION RATE: 18 BRPM | HEART RATE: 77 BPM | SYSTOLIC BLOOD PRESSURE: 127 MMHG | DIASTOLIC BLOOD PRESSURE: 58 MMHG | OXYGEN SATURATION: 96 % | WEIGHT: 134 LBS | TEMPERATURE: 98.1 F

## 2025-02-08 LAB — VIT B12 SERPL-MCNC: 775 PG/ML (ref 232–1245)

## 2025-02-08 PROCEDURE — 93005 ELECTROCARDIOGRAM TRACING: CPT | Performed by: NURSE PRACTITIONER

## 2025-02-08 ASSESSMENT — PAIN - FUNCTIONAL ASSESSMENT: PAIN_FUNCTIONAL_ASSESSMENT: 0-10

## 2025-02-08 ASSESSMENT — PAIN SCALES - GENERAL: PAINLEVEL_OUTOF10: 0

## 2025-02-08 NOTE — ED NOTES
Spoke with Petty at [facility] in [place], she requested an EKG and B12 as well as a few more questions answered states she would talk with provider and get back with us.

## 2025-02-08 NOTE — ED NOTES
Called back to transfer center at [facility], re faxed chart and face sheet over. Waiting for Dr England to call back.

## 2025-02-10 LAB
EKG P AXIS: 53 DEGREES
EKG P-R INTERVAL: 172 MS
EKG Q-T INTERVAL: 376 MS
EKG QRS DURATION: 98 MS
EKG QTC CALCULATION (BAZETT): 379 MS
EKG T AXIS: 53 DEGREES

## 2025-02-10 PROCEDURE — 93010 ELECTROCARDIOGRAM REPORT: CPT | Performed by: INTERNAL MEDICINE

## (undated) DEVICE — CANNULA NSL AD L7FT DIV O2 CO2 W/ M LUERLOCK TRMPT CONN

## (undated) DEVICE — BITE BLOCK ENDOSCP AD 60 FR W/ ADJ STRP PLAS GRN BLOX

## (undated) DEVICE — ENDO KIT: Brand: MEDLINE INDUSTRIES, INC.

## (undated) DEVICE — ENDO KIT,LOURDES HOSPITAL: Brand: MEDLINE INDUSTRIES, INC.

## (undated) DEVICE — SINGLE PORT MANIFOLD: Brand: NEPTUNE 2

## (undated) DEVICE — FORCEPS BX 240CM 2.4MM L NDL RAD JAW 4 M00513334